# Patient Record
Sex: FEMALE
[De-identification: names, ages, dates, MRNs, and addresses within clinical notes are randomized per-mention and may not be internally consistent; named-entity substitution may affect disease eponyms.]

---

## 2022-03-26 ENCOUNTER — APPOINTMENT (OUTPATIENT)
Dept: AFTER HOURS CARE | Facility: EMERGENCY ROOM | Age: 42
End: 2022-03-26
Payer: COMMERCIAL

## 2022-03-26 DIAGNOSIS — J30.2 OTHER SEASONAL ALLERGIC RHINITIS: ICD-10-CM

## 2022-03-26 DIAGNOSIS — H10.023 OTHER MUCOPURULENT CONJUNCTIVITIS, BILATERAL: ICD-10-CM

## 2022-03-26 DIAGNOSIS — H10.10 ACUTE ATOPIC CONJUNCTIVITIS, UNSPECIFIED EYE: ICD-10-CM

## 2022-03-26 PROBLEM — Z00.00 ENCOUNTER FOR PREVENTIVE HEALTH EXAMINATION: Status: ACTIVE | Noted: 2022-03-26

## 2022-03-26 PROCEDURE — 99202 OFFICE O/P NEW SF 15 MIN: CPT | Mod: 95

## 2022-03-26 RX ORDER — CEPHALEXIN 500 MG/1
500 TABLET ORAL EVERY 6 HOURS
Qty: 20 | Refills: 0 | Status: ACTIVE | COMMUNITY
Start: 2022-03-26 | End: 1900-01-01

## 2022-03-26 RX ORDER — SULFAMETHOXAZOLE AND TRIMETHOPRIM 800; 160 MG/1; MG/1
800-160 TABLET ORAL TWICE DAILY
Qty: 14 | Refills: 0 | Status: ACTIVE | COMMUNITY
Start: 2022-03-26 | End: 1900-01-01

## 2022-03-26 RX ORDER — PREDNISONE 20 MG/1
20 TABLET ORAL
Qty: 6 | Refills: 0 | Status: ACTIVE | COMMUNITY
Start: 2022-03-26 | End: 1900-01-01

## 2022-03-26 RX ORDER — POLYMYXIN B SULFATE AND TRIMETHOPRIM 10000; 1 [USP'U]/ML; MG/ML
10000-0.1 SOLUTION OPHTHALMIC 4 TIMES DAILY
Qty: 1 | Refills: 0 | Status: ACTIVE | COMMUNITY
Start: 2022-03-26 | End: 1900-01-01

## 2022-03-26 NOTE — PHYSICAL EXAM
[No Acute Distress] : no acute distress [EOMI] : extraocular movements intact [Well Nourished] : well nourished [Normal Outer Ear/Nose] : the outer ears and nose were normal in appearance [Supple] : supple [No Respiratory Distress] : no respiratory distress  [Normal Insight/Judgement] : insight and judgment were intact [No Accessory Muscle Use] : no accessory muscle use [de-identified] : no pain with eye movement with bilateral mild conjunctivitis , left peiorbital edeam with rdness  [de-identified] : moves upper ext [de-identified] : mild blanchable erythema to the left periorpital area of the face  [de-identified] : alrt and orinted with intact vision

## 2022-03-26 NOTE — REVIEW OF SYSTEMS
[Discharge] : discharge [Dryness] : dryness  [Redness] : redness [Itching] : itching [Nasal Discharge] : nasal discharge [Postnasal Drip] : postnasal drip [Itching] : Itching [Skin Rash] : skin rash [Fever] : no fever [Chills] : no chills [Pain] : no pain [Vision Problems] : no vision problems [Earache] : no earache [Hearing Loss] : no hearing loss [Sore Throat] : no sore throat [Chest Pain] : no chest pain [Palpitations] : no palpitations [Shortness Of Breath] : no shortness of breath [Abdominal Pain] : no abdominal pain [Vomiting] : no vomiting [Dysuria] : no dysuria [Joint Pain] : no joint pain [Muscle Pain] : no muscle pain [Headache] : no headache [Dizziness] : no dizziness [FreeTextEntry4] : swelling of the left area around the left eye  [de-identified] : redness and itchiness to the area around the left eye

## 2022-03-26 NOTE — HISTORY OF PRESENT ILLNESS
[Home] : at home, [unfilled] , at the time of the visit. [Other Location: e.g. Home (Enter Location, City,State)___] : at [unfilled] [Verbal consent obtained from patient] : the patient, [unfilled] [Family Member] : family member [FreeTextEntry8] : 41 yo F who just returned back from [facility] and developed redness of the left eye and now starting to have redness in the right eye and has used pink eye drops she had . she denies any insect bites but woke up this morning with redness and swelling of the left preorbital areal with itchiness \par states she has a hx of seasonal allergies but nothing like this

## 2022-03-26 NOTE — PLAN
[With new medications prescribed] : Treat in place: with new medications prescribed [Primary Care/Internal Medicine/PMD] : Primary Care/Internal Medicine/PMD [FreeTextEntry3] : Opthalmology  [FreeTextEntry1] : 41 yo F who is presenting with left periorbital redness and edema and with bilateral conductivities  likely allergic conjunctivitis vs. cellulitis or viral conjunctivitis because of rapid onset with rhinorrhea and progression from the left to the the right side \par Plan \par -- Hot/Cold Compressors on the left Eye \par -- Patient has Allegra at home and advised to use \par -- Use ophthalmic drops for conjunctivitis\par -- Will only start oral antibiotics if symptoms are not improving

## 2022-03-26 NOTE — ADDENDUM
[FreeTextEntry1] : Called the patient at 1711 via [employer] patient left eye improving and the edma significantly subsided patient is using the eye drops and compressors will prescribe patient steroids to help treat the allergic reaction  \par Based on repeated evaluation this is more consistent will allergic reaction rather than cellulitis at this time will treat with oral steroids and patient is in agreement.